# Patient Record
Sex: FEMALE | Race: WHITE | HISPANIC OR LATINO | Employment: FULL TIME | URBAN - METROPOLITAN AREA
[De-identification: names, ages, dates, MRNs, and addresses within clinical notes are randomized per-mention and may not be internally consistent; named-entity substitution may affect disease eponyms.]

---

## 2023-08-21 ENCOUNTER — APPOINTMENT (OUTPATIENT)
Dept: PRIMARY CARE | Facility: CLINIC | Age: 38
End: 2023-08-21
Payer: COMMERCIAL

## 2023-08-23 PROBLEM — J45.909 ASTHMA IN ADULT (HHS-HCC): Status: ACTIVE | Noted: 2023-08-23

## 2023-08-23 PROBLEM — B00.9 HERPES SIMPLEX: Status: ACTIVE | Noted: 2023-08-23

## 2023-08-23 PROBLEM — E87.1 HYPONATREMIA: Status: ACTIVE | Noted: 2023-08-23

## 2023-08-23 RX ORDER — QUINIDINE GLUCONATE 324 MG
27 TABLET, EXTENDED RELEASE ORAL
COMMUNITY
Start: 2021-01-04

## 2023-08-23 RX ORDER — ALBUTEROL SULFATE 90 UG/1
AEROSOL, METERED RESPIRATORY (INHALATION)
COMMUNITY
Start: 2020-03-25

## 2023-08-23 RX ORDER — B-COMPLEX WITH VITAMIN C
1 TABLET ORAL DAILY
COMMUNITY
Start: 2021-01-04

## 2023-08-23 RX ORDER — ACYCLOVIR 50 MG/G
OINTMENT TOPICAL
COMMUNITY
Start: 2022-09-12

## 2023-08-24 ENCOUNTER — LAB (OUTPATIENT)
Dept: LAB | Facility: LAB | Age: 38
End: 2023-08-24
Payer: COMMERCIAL

## 2023-08-24 ENCOUNTER — OFFICE VISIT (OUTPATIENT)
Dept: PRIMARY CARE | Facility: CLINIC | Age: 38
End: 2023-08-24
Payer: COMMERCIAL

## 2023-08-24 VITALS
HEIGHT: 65 IN | TEMPERATURE: 98.1 F | WEIGHT: 139 LBS | SYSTOLIC BLOOD PRESSURE: 120 MMHG | DIASTOLIC BLOOD PRESSURE: 76 MMHG | BODY MASS INDEX: 23.16 KG/M2

## 2023-08-24 DIAGNOSIS — Z00.00 HEALTH CARE MAINTENANCE: ICD-10-CM

## 2023-08-24 DIAGNOSIS — Z00.00 HEALTH CARE MAINTENANCE: Primary | ICD-10-CM

## 2023-08-24 DIAGNOSIS — Z12.31 ENCOUNTER FOR SCREENING MAMMOGRAM FOR MALIGNANT NEOPLASM OF BREAST: ICD-10-CM

## 2023-08-24 DIAGNOSIS — Z12.4 ENCOUNTER FOR PAPANICOLAOU SMEAR FOR CERVICAL CANCER SCREENING: ICD-10-CM

## 2023-08-24 LAB
ALANINE AMINOTRANSFERASE (SGPT) (U/L) IN SER/PLAS: 11 U/L (ref 7–45)
ALBUMIN (G/DL) IN SER/PLAS: 4.3 G/DL (ref 3.4–5)
ALKALINE PHOSPHATASE (U/L) IN SER/PLAS: 42 U/L (ref 33–110)
ANION GAP IN SER/PLAS: 11 MMOL/L (ref 10–20)
ASPARTATE AMINOTRANSFERASE (SGOT) (U/L) IN SER/PLAS: 15 U/L (ref 9–39)
BASOPHILS (10*3/UL) IN BLOOD BY AUTOMATED COUNT: 0.08 X10E9/L (ref 0–0.1)
BASOPHILS/100 LEUKOCYTES IN BLOOD BY AUTOMATED COUNT: 1.2 % (ref 0–2)
BILIRUBIN TOTAL (MG/DL) IN SER/PLAS: 0.7 MG/DL (ref 0–1.2)
CALCIUM (MG/DL) IN SER/PLAS: 9.4 MG/DL (ref 8.6–10.3)
CARBON DIOXIDE, TOTAL (MMOL/L) IN SER/PLAS: 27 MMOL/L (ref 21–32)
CHLORIDE (MMOL/L) IN SER/PLAS: 104 MMOL/L (ref 98–107)
CHOLESTEROL (MG/DL) IN SER/PLAS: 108 MG/DL (ref 0–199)
CHOLESTEROL IN HDL (MG/DL) IN SER/PLAS: 46.4 MG/DL
CHOLESTEROL/HDL RATIO: 2.3
CREATININE (MG/DL) IN SER/PLAS: 0.84 MG/DL (ref 0.5–1.05)
EOSINOPHILS (10*3/UL) IN BLOOD BY AUTOMATED COUNT: 0.19 X10E9/L (ref 0–0.7)
EOSINOPHILS/100 LEUKOCYTES IN BLOOD BY AUTOMATED COUNT: 2.9 % (ref 0–6)
ERYTHROCYTE DISTRIBUTION WIDTH (RATIO) BY AUTOMATED COUNT: 13.7 % (ref 11.5–14.5)
ERYTHROCYTE MEAN CORPUSCULAR HEMOGLOBIN CONCENTRATION (G/DL) BY AUTOMATED: 32.2 G/DL (ref 32–36)
ERYTHROCYTE MEAN CORPUSCULAR VOLUME (FL) BY AUTOMATED COUNT: 90 FL (ref 80–100)
ERYTHROCYTES (10*6/UL) IN BLOOD BY AUTOMATED COUNT: 4.2 X10E12/L (ref 4–5.2)
GFR FEMALE: 90 ML/MIN/1.73M2
GLUCOSE (MG/DL) IN SER/PLAS: 79 MG/DL (ref 74–99)
HEMATOCRIT (%) IN BLOOD BY AUTOMATED COUNT: 37.6 % (ref 36–46)
HEMOGLOBIN (G/DL) IN BLOOD: 12.1 G/DL (ref 12–16)
IMMATURE GRANULOCYTES/100 LEUKOCYTES IN BLOOD BY AUTOMATED COUNT: 0.2 % (ref 0–0.9)
LDL: 56 MG/DL (ref 0–99)
LEUKOCYTES (10*3/UL) IN BLOOD BY AUTOMATED COUNT: 6.5 X10E9/L (ref 4.4–11.3)
LYMPHOCYTES (10*3/UL) IN BLOOD BY AUTOMATED COUNT: 2.06 X10E9/L (ref 1.2–4.8)
LYMPHOCYTES/100 LEUKOCYTES IN BLOOD BY AUTOMATED COUNT: 31.7 % (ref 13–44)
MONOCYTES (10*3/UL) IN BLOOD BY AUTOMATED COUNT: 0.35 X10E9/L (ref 0.1–1)
MONOCYTES/100 LEUKOCYTES IN BLOOD BY AUTOMATED COUNT: 5.4 % (ref 2–10)
NEUTROPHILS (10*3/UL) IN BLOOD BY AUTOMATED COUNT: 3.8 X10E9/L (ref 1.2–7.7)
NEUTROPHILS/100 LEUKOCYTES IN BLOOD BY AUTOMATED COUNT: 58.6 % (ref 40–80)
PLATELETS (10*3/UL) IN BLOOD AUTOMATED COUNT: 249 X10E9/L (ref 150–450)
POTASSIUM (MMOL/L) IN SER/PLAS: 4.1 MMOL/L (ref 3.5–5.3)
PROTEIN TOTAL: 6.7 G/DL (ref 6.4–8.2)
SODIUM (MMOL/L) IN SER/PLAS: 138 MMOL/L (ref 136–145)
THYROTROPIN (MIU/L) IN SER/PLAS BY DETECTION LIMIT <= 0.05 MIU/L: 2.14 MIU/L (ref 0.44–3.98)
TRIGLYCERIDE (MG/DL) IN SER/PLAS: 27 MG/DL (ref 0–149)
UREA NITROGEN (MG/DL) IN SER/PLAS: 24 MG/DL (ref 6–23)
VLDL: 5 MG/DL (ref 0–40)

## 2023-08-24 PROCEDURE — 36415 COLL VENOUS BLD VENIPUNCTURE: CPT

## 2023-08-24 PROCEDURE — 99395 PREV VISIT EST AGE 18-39: CPT | Performed by: NURSE PRACTITIONER

## 2023-08-24 PROCEDURE — 80061 LIPID PANEL: CPT

## 2023-08-24 PROCEDURE — 84443 ASSAY THYROID STIM HORMONE: CPT

## 2023-08-24 PROCEDURE — 1036F TOBACCO NON-USER: CPT | Performed by: NURSE PRACTITIONER

## 2023-08-24 PROCEDURE — 80053 COMPREHEN METABOLIC PANEL: CPT

## 2023-08-24 PROCEDURE — 87624 HPV HI-RISK TYP POOLED RSLT: CPT

## 2023-08-24 PROCEDURE — 88175 CYTOPATH C/V AUTO FLUID REDO: CPT

## 2023-08-24 PROCEDURE — 85025 COMPLETE CBC W/AUTO DIFF WBC: CPT

## 2023-08-24 RX ORDER — CALCIUM CARB/VITAMIN D3/VIT K1 500-500-40
TABLET,CHEWABLE ORAL
COMMUNITY

## 2023-08-24 ASSESSMENT — ENCOUNTER SYMPTOMS
PSYCHIATRIC NEGATIVE: 1
NEUROLOGICAL NEGATIVE: 1
RESPIRATORY NEGATIVE: 1
CARDIOVASCULAR NEGATIVE: 1
MUSCULOSKELETAL NEGATIVE: 1
CONSTITUTIONAL NEGATIVE: 1

## 2023-08-24 ASSESSMENT — PATIENT HEALTH QUESTIONNAIRE - PHQ9
1. LITTLE INTEREST OR PLEASURE IN DOING THINGS: NOT AT ALL
SUM OF ALL RESPONSES TO PHQ9 QUESTIONS 1 AND 2: 0
1. LITTLE INTEREST OR PLEASURE IN DOING THINGS: SEVERAL DAYS
2. FEELING DOWN, DEPRESSED OR HOPELESS: NOT AT ALL

## 2023-08-24 NOTE — PROGRESS NOTES
"Subjective   Patient ID: Lanette Weldon is a 37 y.o. female who presents for Annual Exam.    HPI   Overall health good health  Dental exam every 6 months.  Brushes twice daily floss occasionally  Vision Wears glasses, eye exam every 2 years   Hearing no loss  Immunization up to date  Diet well balanced  Exercise 6 days a week, cardio, strength 45-90 minutes  Alcohol 0-1/month  Drug use none   Screening Last pap 2020 was normal.     Review of Systems   Constitutional: Negative.    Respiratory: Negative.     Cardiovascular: Negative.    Musculoskeletal: Negative.    Neurological: Negative.    Psychiatric/Behavioral: Negative.         Objective   /76 (BP Location: Right arm, Patient Position: Sitting)   Temp 36.7 °C (98.1 °F) (Temporal)   Ht 1.651 m (5' 5\")   Wt 63 kg (139 lb)   BMI 23.13 kg/m²    Waist circumference 29 inches  Physical Exam  Constitutional:       Appearance: Normal appearance.   HENT:      Right Ear: Tympanic membrane, ear canal and external ear normal.      Left Ear: Tympanic membrane, ear canal and external ear normal.      Nose: Nose normal.      Mouth/Throat:      Mouth: Mucous membranes are moist.      Pharynx: Oropharynx is clear.   Eyes:      Pupils: Pupils are equal, round, and reactive to light.   Cardiovascular:      Rate and Rhythm: Normal rate and regular rhythm.      Pulses: Normal pulses.   Pulmonary:      Effort: Pulmonary effort is normal.   Abdominal:      General: Abdomen is flat. Bowel sounds are normal.      Palpations: Abdomen is soft.   Musculoskeletal:         General: Normal range of motion.      Cervical back: Normal range of motion.   Lymphadenopathy:      Cervical: No cervical adenopathy.   Skin:     General: Skin is warm.   Neurological:      General: No focal deficit present.      Mental Status: She is alert and oriented to person, place, and time.   Psychiatric:         Mood and Affect: Mood normal.         Behavior: Behavior normal.         Assessment/Plan "   Problem List Items Addressed This Visit    None  Visit Diagnoses       Health care maintenance    -  Primary    Relevant Orders    Comprehensive Metabolic Panel    Lipid Panel    Thyroid Stimulating Hormone    CBC and Auto Differential    Encounter for screening mammogram for malignant neoplasm of breast        Relevant Orders    BI mammo bilateral screening tomosynthesis    Encounter for Papanicolaou smear for cervical cancer screening        Relevant Orders    THINPREP PAP TEST

## 2023-08-25 ENCOUNTER — TELEPHONE (OUTPATIENT)
Dept: PRIMARY CARE | Facility: CLINIC | Age: 38
End: 2023-08-25
Payer: COMMERCIAL

## 2023-09-05 ENCOUNTER — TELEPHONE (OUTPATIENT)
Dept: PRIMARY CARE | Facility: CLINIC | Age: 38
End: 2023-09-05
Payer: COMMERCIAL

## 2023-09-05 LAB
COMPLETE PATHOLOGY REPORT: NORMAL
CONVERTED CLINICAL DIAGNOSIS-HISTORY: NORMAL
CONVERTED DIAGNOSIS COMMENT: NORMAL
CONVERTED FINAL DIAGNOSIS: NORMAL
CONVERTED FINAL REPORT PDF LINK TO COPY AND PASTE: NORMAL

## 2023-10-09 ENCOUNTER — ANCILLARY PROCEDURE (OUTPATIENT)
Dept: RADIOLOGY | Facility: CLINIC | Age: 38
End: 2023-10-09
Payer: COMMERCIAL

## 2023-10-09 DIAGNOSIS — Z12.31 ENCOUNTER FOR SCREENING MAMMOGRAM FOR MALIGNANT NEOPLASM OF BREAST: ICD-10-CM

## 2023-10-09 PROCEDURE — 77067 SCR MAMMO BI INCL CAD: CPT | Mod: BILATERAL PROCEDURE

## 2023-10-09 PROCEDURE — 77063 BREAST TOMOSYNTHESIS BI: CPT | Mod: BILATERAL PROCEDURE

## 2023-10-09 PROCEDURE — 77063 BREAST TOMOSYNTHESIS BI: CPT | Mod: 50

## 2023-10-10 ENCOUNTER — TELEPHONE (OUTPATIENT)
Dept: PRIMARY CARE | Facility: CLINIC | Age: 38
End: 2023-10-10
Payer: COMMERCIAL

## 2023-10-20 ENCOUNTER — TELEPHONE (OUTPATIENT)
Dept: PRIMARY CARE | Facility: CLINIC | Age: 38
End: 2023-10-20
Payer: COMMERCIAL

## 2023-12-11 PROBLEM — N92.6 MISSED PERIOD: Status: RESOLVED | Noted: 2020-12-15 | Resolved: 2023-12-11

## 2023-12-12 ENCOUNTER — OFFICE VISIT (OUTPATIENT)
Dept: PRIMARY CARE | Facility: CLINIC | Age: 38
End: 2023-12-12
Payer: COMMERCIAL

## 2023-12-12 ENCOUNTER — LAB (OUTPATIENT)
Dept: LAB | Facility: LAB | Age: 38
End: 2023-12-12
Payer: COMMERCIAL

## 2023-12-12 VITALS
TEMPERATURE: 98.7 F | DIASTOLIC BLOOD PRESSURE: 74 MMHG | SYSTOLIC BLOOD PRESSURE: 110 MMHG | BODY MASS INDEX: 22.8 KG/M2 | WEIGHT: 137 LBS

## 2023-12-12 DIAGNOSIS — R05.2 SUBACUTE COUGH: ICD-10-CM

## 2023-12-12 DIAGNOSIS — R53.82 CHRONIC FATIGUE: Primary | ICD-10-CM

## 2023-12-12 DIAGNOSIS — R53.82 CHRONIC FATIGUE: ICD-10-CM

## 2023-12-12 DIAGNOSIS — R09.81 NASAL CONGESTION: ICD-10-CM

## 2023-12-12 LAB
25(OH)D3 SERPL-MCNC: 24 NG/ML (ref 30–100)
BASOPHILS # BLD AUTO: 0.07 X10*3/UL (ref 0–0.1)
BASOPHILS NFR BLD AUTO: 0.7 %
EOSINOPHIL # BLD AUTO: 0.16 X10*3/UL (ref 0–0.7)
EOSINOPHIL NFR BLD AUTO: 1.5 %
ERYTHROCYTE [DISTWIDTH] IN BLOOD BY AUTOMATED COUNT: 14.3 % (ref 11.5–14.5)
FERRITIN SERPL-MCNC: 25 NG/ML (ref 8–150)
HCT VFR BLD AUTO: 38.1 % (ref 36–46)
HGB BLD-MCNC: 12.3 G/DL (ref 12–16)
IMM GRANULOCYTES # BLD AUTO: 0.03 X10*3/UL (ref 0–0.7)
IMM GRANULOCYTES NFR BLD AUTO: 0.3 % (ref 0–0.9)
IRON SATN MFR SERPL: 30 % (ref 25–45)
IRON SERPL-MCNC: 100 UG/DL (ref 35–150)
LYMPHOCYTES # BLD AUTO: 2.68 X10*3/UL (ref 1.2–4.8)
LYMPHOCYTES NFR BLD AUTO: 25.4 %
MCH RBC QN AUTO: 28.3 PG (ref 26–34)
MCHC RBC AUTO-ENTMCNC: 32.3 G/DL (ref 32–36)
MCV RBC AUTO: 88 FL (ref 80–100)
MONOCYTES # BLD AUTO: 0.42 X10*3/UL (ref 0.1–1)
MONOCYTES NFR BLD AUTO: 4 %
NEUTROPHILS # BLD AUTO: 7.19 X10*3/UL (ref 1.2–7.7)
NEUTROPHILS NFR BLD AUTO: 68.1 %
NRBC BLD-RTO: 0 /100 WBCS (ref 0–0)
PLATELET # BLD AUTO: 305 X10*3/UL (ref 150–450)
RBC # BLD AUTO: 4.34 X10*6/UL (ref 4–5.2)
TIBC SERPL-MCNC: 329 UG/DL (ref 240–445)
UIBC SERPL-MCNC: 229 UG/DL (ref 110–370)
VIT B12 SERPL-MCNC: 557 PG/ML (ref 211–911)
WBC # BLD AUTO: 10.6 X10*3/UL (ref 4.4–11.3)

## 2023-12-12 PROCEDURE — 83540 ASSAY OF IRON: CPT

## 2023-12-12 PROCEDURE — 1036F TOBACCO NON-USER: CPT | Performed by: NURSE PRACTITIONER

## 2023-12-12 PROCEDURE — 36415 COLL VENOUS BLD VENIPUNCTURE: CPT

## 2023-12-12 PROCEDURE — 99214 OFFICE O/P EST MOD 30 MIN: CPT | Performed by: NURSE PRACTITIONER

## 2023-12-12 PROCEDURE — 83550 IRON BINDING TEST: CPT

## 2023-12-12 PROCEDURE — 82728 ASSAY OF FERRITIN: CPT

## 2023-12-12 PROCEDURE — 86038 ANTINUCLEAR ANTIBODIES: CPT

## 2023-12-12 PROCEDURE — 85025 COMPLETE CBC W/AUTO DIFF WBC: CPT

## 2023-12-12 PROCEDURE — 82607 VITAMIN B-12: CPT

## 2023-12-12 PROCEDURE — 82306 VITAMIN D 25 HYDROXY: CPT

## 2023-12-12 RX ORDER — SERTRALINE HYDROCHLORIDE 25 MG/1
TABLET, FILM COATED ORAL
COMMUNITY
Start: 2023-11-19

## 2023-12-12 ASSESSMENT — ENCOUNTER SYMPTOMS
WHEEZING: 0
NEUROLOGICAL NEGATIVE: 1
PSYCHIATRIC NEGATIVE: 1
CONSTITUTIONAL NEGATIVE: 1
RESPIRATORY NEGATIVE: 1
CARDIOVASCULAR NEGATIVE: 1
MUSCULOSKELETAL NEGATIVE: 1

## 2023-12-12 NOTE — PROGRESS NOTES
Subjective   Patient ID: Lanette Weldon is a 38 y.o. female who presents for URI (Runny nose, cough, body aches, fatigue intermittent x 5 months).    HPI Presents today with concerns for being ill 4-5 times in the last 6 months.  Her son is in , he gets sick and then she gets it.  Last abut 7-10 days and goes away.  Is concerned it is happening so often.  States she gets back to normal activity and strength each time and then will get sick again. Is fatigued when sick but does also recover from that  Blood work 8/2023 reviewed as noted.    was sick two of the times and he was tested for influenza and he had it.   Always starts as sore throat and then moves into nasal congestion and then gets the cough.  Symptoms always resolve completely    Review of Systems   Constitutional: Negative.    HENT:          As noted in HPI     Respiratory: Negative.  Negative for wheezing.         As noted in HPI     Cardiovascular: Negative.    Musculoskeletal: Negative.    Neurological: Negative.    Psychiatric/Behavioral: Negative.         Objective   /74 (BP Location: Left arm, Patient Position: Sitting)   Temp 37.1 °C (98.7 °F) (Temporal)   Wt 62.1 kg (137 lb)   BMI 22.80 kg/m²     Physical Exam  Constitutional:       Appearance: Normal appearance.   HENT:      Right Ear: Tympanic membrane, ear canal and external ear normal.      Left Ear: Tympanic membrane, ear canal and external ear normal.      Mouth/Throat:      Mouth: Mucous membranes are moist.      Pharynx: Oropharynx is clear.   Neck:      Thyroid: No thyroid mass, thyromegaly or thyroid tenderness.   Cardiovascular:      Rate and Rhythm: Normal rate and regular rhythm.   Pulmonary:      Effort: Pulmonary effort is normal.      Breath sounds: Normal breath sounds.   Musculoskeletal:         General: Normal range of motion.   Lymphadenopathy:      Cervical: No cervical adenopathy.   Neurological:      General: No focal deficit present.      Mental  Status: She is alert.   Psychiatric:         Mood and Affect: Mood normal.         Behavior: Behavior normal.         Assessment/Plan   Problem List Items Addressed This Visit    None  Visit Diagnoses         Codes    Chronic fatigue    -  Primary R53.82    Relevant Orders    CBC and Auto Differential    Vitamin B12    Vitamin D 25-Hydroxy,Total (for eval of Vitamin D levels)    HERB    Ferritin    Iron and TIBC    Nasal congestion     R09.81    Relevant Orders    Ferritin    Subacute cough     R05.2    Relevant Orders    Ferritin

## 2023-12-13 ENCOUNTER — TELEPHONE (OUTPATIENT)
Dept: PRIMARY CARE | Facility: CLINIC | Age: 38
End: 2023-12-13
Payer: COMMERCIAL

## 2023-12-13 DIAGNOSIS — R05.2 SUBACUTE COUGH: Primary | ICD-10-CM

## 2023-12-13 DIAGNOSIS — B00.9 HERPES SIMPLEX: ICD-10-CM

## 2023-12-13 LAB — ANA SER QL HEP2 SUBST: NEGATIVE

## 2023-12-13 NOTE — TELEPHONE ENCOUNTER
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 Please advise

## 2023-12-15 ENCOUNTER — TELEPHONE (OUTPATIENT)
Dept: PRIMARY CARE | Facility: CLINIC | Age: 38
End: 2023-12-15
Payer: COMMERCIAL

## 2023-12-15 RX ORDER — ACYCLOVIR 400 MG/1
400 TABLET ORAL 3 TIMES DAILY
Qty: 15 TABLET | Refills: 0 | Status: SHIPPED | OUTPATIENT
Start: 2023-12-15 | End: 2023-12-20

## 2023-12-15 NOTE — TELEPHONE ENCOUNTER
Pt called to request a refill on her Acyclovir tablets, pt states she has a cold sore in the mouth and has no refills.      [facility] [phone]    Pt also needs to know how the units of the Vitamin D3 you would like her to be taking.

## 2024-05-07 ENCOUNTER — TELEPHONE (OUTPATIENT)
Dept: PRIMARY CARE | Facility: CLINIC | Age: 39
End: 2024-05-07
Payer: COMMERCIAL

## 2024-05-07 DIAGNOSIS — J30.2 SEASONAL ALLERGIES: Primary | ICD-10-CM

## 2024-05-07 NOTE — TELEPHONE ENCOUNTER
Patient states she is suffering badly from what she believes is seasonal allergies & asking if you can refer her to an allergist. Please advise

## 2024-05-07 NOTE — TELEPHONE ENCOUNTER
Spoke with patient, sent MyChart message with the OTC medications so she/her  can check if safe to take while she is breastfeeding. Gave contact info for referral scheduling. She verbalized understanding

## 2024-06-24 ENCOUNTER — TELEPHONE (OUTPATIENT)
Dept: PRIMARY CARE | Facility: CLINIC | Age: 39
End: 2024-06-24
Payer: COMMERCIAL

## 2024-06-24 NOTE — TELEPHONE ENCOUNTER
Patient requesting refill on her Acyclovir ointment to [facility]. Has been having cold sore breakouts.  Last OV 12/12/23  Please advise

## 2024-06-25 DIAGNOSIS — B00.9 HERPES SIMPLEX: Primary | ICD-10-CM

## 2024-06-25 RX ORDER — ACYCLOVIR 50 MG/G
OINTMENT TOPICAL
Qty: 15 G | Refills: 1 | Status: SHIPPED | OUTPATIENT
Start: 2024-06-25

## 2024-07-12 ENCOUNTER — TELEPHONE (OUTPATIENT)
Dept: PRIMARY CARE | Facility: CLINIC | Age: 39
End: 2024-07-12
Payer: COMMERCIAL

## 2024-07-12 DIAGNOSIS — N39.3 STRESS INCONTINENCE OF URINE: Primary | ICD-10-CM

## 2024-07-12 NOTE — TELEPHONE ENCOUNTER
Patient called requesting a referral for pelvic floor therapy. States she's been working out more and has noticed she's had some leaking. Please advise

## 2024-07-15 NOTE — TELEPHONE ENCOUNTER
Patient informed of above information and verbalized understanding, gave contact info for [facility] [phone]

## 2024-08-13 ENCOUNTER — EVALUATION (OUTPATIENT)
Dept: PHYSICAL THERAPY | Facility: HOSPITAL | Age: 39
End: 2024-08-13
Payer: COMMERCIAL

## 2024-08-13 DIAGNOSIS — N39.3 STRESS INCONTINENCE OF URINE: Primary | ICD-10-CM

## 2024-08-13 DIAGNOSIS — M62.81 MUSCLE WEAKNESS: ICD-10-CM

## 2024-08-13 PROCEDURE — 97535 SELF CARE MNGMENT TRAINING: CPT | Mod: GP | Performed by: PHYSICAL THERAPIST

## 2024-08-13 PROCEDURE — 97162 PT EVAL MOD COMPLEX 30 MIN: CPT | Mod: GP | Performed by: PHYSICAL THERAPIST

## 2024-08-13 PROCEDURE — 97110 THERAPEUTIC EXERCISES: CPT | Mod: GP | Performed by: PHYSICAL THERAPIST

## 2024-08-13 ASSESSMENT — PAIN SCALES - GENERAL: PAINLEVEL_OUTOF10: 6

## 2024-08-13 ASSESSMENT — ENCOUNTER SYMPTOMS
LOSS OF SENSATION IN FEET: 0
DEPRESSION: 1
OCCASIONAL FEELINGS OF UNSTEADINESS: 0

## 2024-08-13 ASSESSMENT — PAIN - FUNCTIONAL ASSESSMENT: PAIN_FUNCTIONAL_ASSESSMENT: 0-10

## 2024-08-13 ASSESSMENT — PATIENT HEALTH QUESTIONNAIRE - PHQ9
10. IF YOU CHECKED OFF ANY PROBLEMS, HOW DIFFICULT HAVE THESE PROBLEMS MADE IT FOR YOU TO DO YOUR WORK, TAKE CARE OF THINGS AT HOME, OR GET ALONG WITH OTHER PEOPLE: SOMEWHAT DIFFICULT
2. FEELING DOWN, DEPRESSED OR HOPELESS: NOT AT ALL
SUM OF ALL RESPONSES TO PHQ9 QUESTIONS 1 AND 2: 1
1. LITTLE INTEREST OR PLEASURE IN DOING THINGS: SEVERAL DAYS

## 2024-08-13 ASSESSMENT — PAIN DESCRIPTION - DESCRIPTORS: DESCRIPTORS: ACHING

## 2024-08-13 NOTE — PROGRESS NOTES
" Physical Therapy    PELVIC FLOOR EVALUATION AND TREATMENT    Name: Lanette Weldon  MRN: [id number]  : 1985  Today's Date: 2024  Visit Number: 1  PT Evaluation Time Entry  PT Evaluation (Moderate) Time Entry: 30  PT Therapeutic Procedures Time Entry  Therapeutic Exercise Time Entry: 15  Self-Care/Home Mgmt Trainin                 Assessment:   PT Assessment  PT Assessment Results: Decreased strength, Decreased endurance, Decreased coordination, Pain  Rehab Prognosis: Good    Pt presents with symptoms of mixed stress and urge urinary incontinence, urinary urgency, nocturia, core and lateral pelvic girdle weakness, decreased endurance, PF muscle weakness and poor endurance impairing her QOL with higher intensity functional activities.  Recommend skilled pelvic floor physical therapy to address the above deficits that affect functional activities of daily living.   Skilled intervention is needed to train and provide proper technique, educate patient on progression, risks, prognosis, and provide adequate feedback for technique correction and implementation.     Discharge planned upon achievement of goals or reaching maximum benefit from skilled physical therapy services.    Plan:   OP PT Plan  Treatment/Interventions: Biofeedback, Education/ Instruction, Electrical stimulation, Manual therapy, Neuromuscular re-education, Self care/ home management, Therapeutic activities, Therapeutic exercises  PT Plan: Skilled PT  PT Frequency: 1 time per week  Duration: 12 weeks  Number of Treatments Authorized: 60v per yr  Rehab Potential: Good  Plan of Care Agreement: Patient  *Discuss timed voiding during the day in attempt to reduce nocturia    Interventions: Issued handouts: Overcoming the urge, the \"Knack\"    Current Problem:  Problem List Items Addressed This Visit             ICD-10-CM    Stress incontinence of urine - Primary N39.3    Relevant Orders    Follow Up In Physical Therapy    Muscle weakness M62.81    " Relevant Orders    Follow Up In Physical Therapy     Subjective   General  Pt reports noticing urinary leakage for a few months when she is exercising at the gym, more so worse with cardio than weights. She feels like it has steadily been worsening. She reports life-long hx of urinary urgency and also has been getting up to use the bathroom 2-3x/night since having her child. She also reports having issues with being unable to hold her bladder once she gets the urge and has had to void in the car on a regular basis due to being unable to stop to find a bathroom.    Precautions  Precautions  STEADI Fall Risk Score (The score of 4 or more indicates an increased risk of falling): 4  Precautions Comment: none noted     Pain  Pain Assessment: 0-10  0-10 (Numeric) Pain Score: 6  Pain Type: Chronic pain  Pain Location: Back  Pain Orientation: Lower, Left  Pain Radiating Towards: intermittently L LE  Pain Descriptors: Aching  Pain Frequency: Intermittent    Objective   PELVIC HISTORY:  Chief Complaint/Description of Symptoms: UTE and leakage with coughing, laughing, sneezing and exercise, especially higher impact.   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    EXERCISE:  Do you do Kegels? Intermittently   Current exercise regime: 6 days/week    BLADDER:  Bladder  Excessive urinary urgency: always  Daytime voiding frequency : 4-6x/day  Nighttime voiding frequency: 2-3 times  Unintentional urine loss: almost daily  Leakage occurs with: exercise, cough, lifting, sneeze, urge  Leakage amount: small  Protection: none  Daily fluid intake: 80oz water  Daily caffeine  intake: minmal  Difficulty initiating urine flow: not at all  Slow/Weak urine stream: not at all  Able to void completely: No, Yes  Frequent UTI's: No    BOWEL:  Bowel  Unintentional stool loss: never  Bowel movement frequency: once a day, every other day  Frequent diarrhea: No  Constipation/straining,incomplete bowel movement: No  Taking stool softeners or fiber supplements: No  [place] Stool Rating Scale: variable consistency  Excessive Bowel Urgency: Intermittent    EXTERNAL OBSERVATION:  Voluntary Contraction: (+)   Voluntary Relaxation: (+)  Involuntary Contraction: (-)  Involuntary Relaxation (+)        INTERNAL VAGINAL EXAMINATION:  PFM Strength: 3,3+/5  Coordination: 12 in 10 seconds   Endurance: 1-2 second holds x 2 repetitions    INTERNAL PALPATION:   (-) pain and tightness at PF musculature    EXTERNAL PALPATION:  Levator Ani: (-) Pain/Tightness   Bulbo: (-) Pain/Tightness   Ischiocavernosus: (-) Pain/Tightness  Transverse perineum: (-) Pain/Tightness     Iliopsoas: (-) Pain/Tightness   Adductor: (-) Pain/Tightness   Abdominals: (-) Pain/Tightness     POSTURE: Increased lumbar lordosis      MMT R/L:  Hip flex: 4/5 [place]  Hip add: 5/5 Michelet  Hip abd: 4-/5 Miche[place]  Hip IR: 4-,4/5  ; 3+,4-/5  Hip ER: 4-/5 Michelet  Hip ext: 4-/5  TA: 3+,4-/5    ROM R/L:  Lumbar flex: Slightly decreased due to LBP  Lumbar ext: WFL  Hip flex: WNL  Hip ext: WNL  Hip IR: slightly decreased  Hip ER: WNL    FLEXIBILITY R/L:  Hamstrings: mild tightness  Psoas: Mild tightness    OUTCOMES MEASURE:  Female NIH-CPSI: 22 (Pain 10, Urinary 3, QOL 9)    Education:   Outpatient Education  Individual(s) Educated: Patient  Education Provided: Anatomy, Body Mechanics, Home Exercise Program, POC, Physiology  Risk and Benefits Discussed with Patient/Caregiver/Other: yes  Patient/Caregiver Demonstrated Understanding: yes  Plan of Care Discussed and Agreed Upon: yes  Patient Response to Education: Patient/Caregiver Verbalized Understanding of Information,  Patient/Caregiver Performed Return Demonstration of Exercises/Activities   HEP issued: [email or web address]  Access Code: [email or web address]    Careplan Goals:  Active       UTE       1) Pt to be Indep with a home exercise program with >80% adherence by the patient       Start:  08/13/24    Expected End:  09/24/24            2) Pt to report 50% reduction in incontinence episodes to indicate increased bladder control during exercise       Start:  08/13/24    Expected End:  11/05/24            3) Improve MMT of lateral pelvic girdle to 5/5to improve pelvic stability with functional activities        Start:  08/13/24    Expected End:  11/05/24            4) Functional Outcome NIH CPSI score improves by >50% raw score to indicate improvement in subscales       Start:  08/13/24    Expected End:  11/05/24            5) Pt to report decreased nocturia to </=1x/night to improve her sleep quality       Start:  08/13/24    Expected End:  11/05/24                  Pippa Aviles, PT

## 2024-08-21 PROBLEM — M62.81 MUSCLE WEAKNESS: Status: ACTIVE | Noted: 2024-08-21

## 2024-08-22 ENCOUNTER — APPOINTMENT (OUTPATIENT)
Dept: ALLERGY | Facility: CLINIC | Age: 39
End: 2024-08-22
Payer: COMMERCIAL

## 2024-08-26 ENCOUNTER — LAB (OUTPATIENT)
Dept: LAB | Facility: LAB | Age: 39
End: 2024-08-26
Payer: COMMERCIAL

## 2024-08-26 ENCOUNTER — APPOINTMENT (OUTPATIENT)
Dept: ALLERGY | Facility: CLINIC | Age: 39
End: 2024-08-26
Payer: COMMERCIAL

## 2024-08-26 VITALS
HEIGHT: 65 IN | HEART RATE: 66 BPM | TEMPERATURE: 98.2 F | SYSTOLIC BLOOD PRESSURE: 112 MMHG | BODY MASS INDEX: 25.69 KG/M2 | WEIGHT: 154.2 LBS | OXYGEN SATURATION: 98 % | DIASTOLIC BLOOD PRESSURE: 70 MMHG

## 2024-08-26 DIAGNOSIS — J30.2 SEASONAL ALLERGIES: ICD-10-CM

## 2024-08-26 DIAGNOSIS — T63.94XA TOXIC EFFECT OF VENOM, UNDETERMINED INTENT, INITIAL ENCOUNTER: ICD-10-CM

## 2024-08-26 DIAGNOSIS — J31.0 CHRONIC RHINITIS: Primary | ICD-10-CM

## 2024-08-26 DIAGNOSIS — J31.0 CHRONIC RHINITIS: ICD-10-CM

## 2024-08-26 PROCEDURE — 99204 OFFICE O/P NEW MOD 45 MIN: CPT | Performed by: ALLERGY & IMMUNOLOGY

## 2024-08-26 PROCEDURE — 86003 ALLG SPEC IGE CRUDE XTRC EA: CPT

## 2024-08-26 PROCEDURE — 82785 ASSAY OF IGE: CPT

## 2024-08-26 PROCEDURE — 36415 COLL VENOUS BLD VENIPUNCTURE: CPT

## 2024-08-26 RX ORDER — OLOPATADINE HYDROCHLORIDE 2 MG/ML
1 SOLUTION/ DROPS OPHTHALMIC DAILY PRN
Qty: 2.5 ML | Refills: 5 | Status: SHIPPED | OUTPATIENT
Start: 2024-08-26 | End: 2025-08-26

## 2024-08-26 RX ORDER — BENZOCAINE .13; .15; .5; 2 G/100G; G/100G; G/100G; G/100G
2 GEL ORAL DAILY
Qty: 8.6 G | Refills: 11 | Status: SHIPPED | OUTPATIENT
Start: 2024-08-26 | End: 2025-08-26

## 2024-08-26 ASSESSMENT — ENCOUNTER SYMPTOMS
RESPIRATORY NEGATIVE: 1
EYES NEGATIVE: 1
ALLERGIC/IMMUNOLOGIC NEGATIVE: 1
CONSTITUTIONAL NEGATIVE: 1
GASTROINTESTINAL NEGATIVE: 1
CARDIOVASCULAR NEGATIVE: 1
HEMATOLOGIC/LYMPHATIC NEGATIVE: 1
MUSCULOSKELETAL NEGATIVE: 1

## 2024-08-26 NOTE — PROGRESS NOTES
Lanette Weldon presents for initial evaluation today.      Lanette Weldon was seen at the request of DO Bowers for a chief complaint of concern for allergies; a report with my findings is being sent via written or electronic means to DO Bowers with my recommendations for treatment    She provides the following history:    Rhinitis:  She reports that she gets  itchy eyes, runny nose, sneezing, nasal congestion, post-nasal drainage and cough.  She notes that she had these symptoms since being a teenager.  She notes that symptoms have been worse in the past 2-3 years.  From 2012 to about 2017, and previously lived in [facility].  She notes that when she lived in [place] her allergy symptoms are most bothersome in the spring, however in [facility] her symptoms seem to be bothersome spring through fall.  She also notices flare in symptoms with barometric pressure changes.      She is currently breast-feeding.  She used cetirizine 2 days ago.  She is not on nasal spray.  She has tried olopatadine eyedrops which were helpful.    Asthma: She reports that she had childhood asthma.  She has been prescribed Albuterol as needed, but has never used Albuterol.      Eczema: Reports childhood eczema.     Food allergy:  She reports that she had positive allergy testing in [facility] in 2009 which was positive to octopus. She avoids octopus. She tolerates shrimp, lobster, crab, calamari, scallop    Venom allergy:  Reports throat swelling with wasp sting in late teens.  She previously carried an Epipen.  She has had honeybee sting since without issue.     Drug allergy: Reports that NSAIDS and scopolamine caused throat closing.      Environmental History:  Type of home:  House  Pets in the house: Cat, cat is not in bedroom.  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    HENT:  Positive for congestion and postnasal drip.    Eyes: Negative.    Respiratory: Negative.     Cardiovascular: Negative.    Gastrointestinal: Negative.    Musculoskeletal: Negative.    Skin: Negative.    Allergic/Immunologic: Negative.    Hematological: Negative.      Vital signs:  /70 (BP Location: Right arm, Patient Position: Sitting, BP Cuff Size: Adult)   Pulse 66   Temp 36.8 °C (98.2 °F)   Ht 1.651 m (5' 5\")   Wt 69.9 kg (154 lb 3.2 oz) Comment: shoes and clothes on  SpO2 98%   BMI 25.66 kg/m²     Physical Exam:  GENERAL: Alert, oriented and in no acute distress.     HEENT: EYES: No conjunctival injection or cobblestoning. Nose: nasal turbinates are not edematous and are not boggy.  There is no mucous stranding, polyps, or blood    noted. EARS: Tympanic membranes are clear. MOUTH: moist and pink with no exudates, ulcers, or thrush. NECK: is supple, without adenopathy.  No upper airway stridor noted.       HEART: regular rate and rhythm.       LUNGS: Clear to auscultation bilaterally. No wheezing, rhonchi or rales.        ABDOMEN: Positive bowel sounds, soft, nontender, nondistended.       EXTREMITIES: No clubbing or edema.        NEURO:  Normal affect.  Gait normal.      SKIN: No rash, hives, or angioedema noted    Impression:  1. Chronic rhinitis    2. Seasonal allergies    3. Toxic effect of venom, undetermined intent, initial encounter      Assessment and Plan:  Chronic rhinitis: Unable to perform allergy skin prick testing today due to recent antihistamine use.  Will further evaluate with environmental allergen specific IgE panel.  Recommend using Rhinocort (pregnancy category B) 2 sprays each nostril once daily.  We reviewed proper nasal spray administration technique.  May continue to use " cetirizine 10 mg daily as needed. Prescribed olopatadine 0.2% eyedrops 1 drop each eye daily as needed    Possible venom allergy: Will further evaluate with specific IgE to honeybee, wasp, yellow jacket, white hornet, yellow hornet.    Will make further recommendations pending results of testing.

## 2024-08-26 NOTE — LETTER
August 26, 2024     DO Bowers  [address]  [facility]  [facility] [address]    Patient: Lanette Weldon   YOB: 1985   Date of Visit: 8/26/2024       Dear DO Wells:    Thank you for referring Lanette Weldon to me for evaluation. Below are my notes for this consultation.  If you have questions, please do not hesitate to call me. I look forward to following your patient along with you.       Sincerely,     Princess VITO Reich MD      CC: No Recipients  ______________________________________________________________________________________    Lanette Weldon presents for initial evaluation today.      Lanette Weldon was seen at the request of DO Bowers for a chief complaint of concern for allergies; a report with my findings is being sent via written or electronic means to DO Bowers with my recommendations for treatment    She provides the following history:    Rhinitis:  She reports that she gets  itchy eyes, runny nose, sneezing, nasal congestion, post-nasal drainage and cough.  She notes that she had these symptoms since being a teenager.  She notes that symptoms have been worse in the past 2-3 years.  From 2012 to about 2017, and previously lived in [facility].  She notes that when she lived in [place] her allergy symptoms are most bothersome in the spring, however in [facility] her symptoms seem to be bothersome spring through fall.  She also notices flare in symptoms with barometric pressure changes.      She is currently breast-feeding.  She used cetirizine 2 days ago.  She is not on nasal spray.  She has tried olopatadine eyedrops which were helpful.    Asthma: She reports that she had childhood asthma.  She has been prescribed Albuterol as needed, but has never used Albuterol.      Eczema: Reports childhood eczema.     Food allergy:  She reports that she had positive allergy testing in [facility] in 2009 which was  "positive to octopus. She avoids octopus. She tolerates shrimp, lobster, crab, calamari, scallop    Venom allergy:  Reports throat swelling with wasp sting in late teens.  She previously carried an Epipen.  She has had honeybee sting since without issue.     Drug allergy: Reports that NSAIDS and scopolamine caused throat closing.      Environmental History:  Type of home:  House  Pets in the house: Cat, cat is not in bedroom.  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    HENT:  Positive for congestion and postnasal drip.    Eyes: Negative.    Respiratory: Negative.     Cardiovascular: Negative.    Gastrointestinal: Negative.    Musculoskeletal: Negative.    Skin: Negative.    Allergic/Immunologic: Negative.    Hematological: Negative.      Vital signs:  /70 (BP Location: Right arm, Patient Position: Sitting, BP Cuff Size: Adult)   Pulse 66   Temp 36.8 °C (98.2 °F)   Ht 1.651 m (5' 5\")   Wt 69.9 kg (154 lb 3.2 oz) Comment: shoes and clothes on  SpO2 98%   BMI 25.66 kg/m²     Physical Exam:  GENERAL: Alert, oriented and in no acute distress.     HEENT: EYES: No conjunctival injection or cobblestoning. Nose: nasal turbinates are not edematous and are not boggy.  There is no mucous stranding, polyps, or blood    noted. EARS: Tympanic membranes are clear. MOUTH: moist and pink with no exudates, ulcers, or thrush. NECK: is supple, without adenopathy.  No upper airway stridor noted.       HEART: regular rate and rhythm.       LUNGS: Clear to auscultation bilaterally. No wheezing, rhonchi or rales.        ABDOMEN: Positive bowel sounds, soft, nontender, nondistended.       " EXTREMITIES: No clubbing or edema.        NEURO:  Normal affect.  Gait normal.      SKIN: No rash, hives, or angioedema noted    Impression:  1. Chronic rhinitis    2. Seasonal allergies    3. Toxic effect of venom, undetermined intent, initial encounter      Assessment and Plan:  Chronic rhinitis: Unable to perform allergy skin prick testing today due to recent antihistamine use.  Will further evaluate with environmental allergen specific IgE panel.  Recommend using Rhinocort (pregnancy category B) 2 sprays each nostril once daily.  We reviewed proper nasal spray administration technique.  May continue to use cetirizine 10 mg daily as needed. Prescribed olopatadine 0.2% eyedrops 1 drop each eye daily as needed    Possible venom allergy: Will further evaluate with specific IgE to honeybee, wasp, yellow jacket, white hornet, yellow hornet.    Will make further recommendations pending results of testing.

## 2024-08-26 NOTE — PATIENT INSTRUCTIONS
It was nice to meet you today    Please have labs drawn. Please note that some labs will come back sooner than others.  We will contact you when all labs have returned so that we can discuss results.   She lived in [place]  Recommend starting Rhinocort 2 sprays each nostril once daily    Technique for nasal spray administration:  First, look slightly down, breathe normally, you do not need to sniff while you spray.  To use the nose spray, place the tip in your nose with the opposite hand (left hand, right side of nose, right hand, left side of nose), and aim or point toward the outside of the nose.  Do not sniff or snort medication afterwards as this can cause most of the medication to be swallowed.  Rather, dab your nose with a tissue if any runs out.    You may use Cetirizine (Zyrtec) 10 mg once daily as needed     You may use olopatadine 0.2% eyedrops 1 drop each eye daily as needed    We will contact you with results and discuss plan for follow up

## 2024-08-27 LAB
A ALTERNATA IGE QN: <0.1 KU/L
A FUMIGATUS IGE QN: <0.1 KU/L
BERMUDA GRASS IGE QN: 5.47 KU/L
BOXELDER IGE QN: 0.89 KU/L
C HERBARUM IGE QN: <0.1 KU/L
CALIF WALNUT POLN IGE QN: 4.79 KU/L
CAT DANDER IGE QN: 26.2 KU/L
CMN PIGWEED IGE QN: 1.46 KU/L
COMMON RAGWEED IGE QN: 2.32 KU/L
COTTONWOOD IGE QN: <0.1 KU/L
D FARINAE IGE QN: 8.46 KU/L
D PTERONYSS IGE QN: 10.2 KU/L
DOG DANDER IGE QN: 5.96 KU/L
ENGL PLANTAIN IGE QN: 0.15 KU/L
GOOSEFOOT IGE QN: 0.34 KU/L
JOHNSON GRASS IGE QN: 7.21 KU/L
KENT BLUE GRASS IGE QN: 17.1 KU/L
LONDON PLANE IGE QN: 0.66 KU/L
MT JUNIPER IGE QN: <0.1 KU/L
P NOTATUM IGE QN: <0.1 KU/L
PECAN/HICK TREE IGE QN: 12.3 KU/L
ROACH IGE QN: <0.1 KU/L
SALTWORT IGE QN: 0.47 KU/L
SHEEP SORREL IGE QN: <0.1 KU/L
SILVER BIRCH IGE QN: >100 KU/L
TIMOTHY IGE QN: 11.1 KU/L
TOTAL IGE SMQN RAST: 402 KU/L
WHITE ASH IGE QN: 0.54 KU/L
WHITE ELM IGE QN: 6.02 KU/L
WHITE MULBERRY IGE QN: <0.1 KU/L
WHITE OAK IGE QN: 67 KU/L

## 2024-08-28 ENCOUNTER — TELEPHONE (OUTPATIENT)
Dept: ALLERGY | Facility: HOSPITAL | Age: 39
End: 2024-08-28
Payer: COMMERCIAL

## 2024-08-28 LAB
HONEY BEE IGE QN: <0.1 KU/L
PAPER WASP IGE QN: <0.1 KU/L
WHITEFACED HORNET IGE QN: <0.1 KU/L
YELLOW HORNET IGE QN: <0.1 KU/L
YELLOW JACKET IGE QN: <0.1 KU/L

## 2024-08-28 NOTE — TELEPHONE ENCOUNTER
Please let Lanette know that her allergy testing is positive for tree pollen, grass pollen, weed pollen, cat, dog, dust mite.  Her venom allergy testing is negative for honeybee, yellowjacket, wasp, yellow hornet, white hornet.  Recommend using Rhinocort, cetirizine, olopatadine eyedrops as discussed at visit.  Would like to see her in follow-up in spring 2025 or sooner if needed.

## 2024-08-29 NOTE — TELEPHONE ENCOUNTER
Placed call to Lanette Weldon who verified patient's name and . Discussed the results and recommendations as described by the provider. Lanette Weldon verbalized understanding and had no further questions or concerns. Lanette Weldon provided with division number in case she has any needs we can help with.

## 2024-08-30 ENCOUNTER — TREATMENT (OUTPATIENT)
Dept: PHYSICAL THERAPY | Facility: HOSPITAL | Age: 39
End: 2024-08-30
Payer: COMMERCIAL

## 2024-08-30 DIAGNOSIS — N39.3 STRESS INCONTINENCE OF URINE: ICD-10-CM

## 2024-08-30 DIAGNOSIS — M62.81 MUSCLE WEAKNESS: ICD-10-CM

## 2024-08-30 PROCEDURE — 97110 THERAPEUTIC EXERCISES: CPT | Mod: GP | Performed by: PHYSICAL THERAPIST

## 2024-08-30 ASSESSMENT — PAIN SCALES - GENERAL: PAINLEVEL_OUTOF10: 3

## 2024-08-30 ASSESSMENT — PAIN - FUNCTIONAL ASSESSMENT: PAIN_FUNCTIONAL_ASSESSMENT: 0-10

## 2024-08-30 NOTE — PROGRESS NOTES
" Physical Therapy    PELVIC FLOOR TREATMENT    Name: Lanette Weldon  MRN: [id number]  : 1985  Today's Date: 2024  Visit Number: 2     PT Therapeutic Procedures Time Entry  Therapeutic Exercise Time Entry: 40                 Assessment:    Pt performed increased core and lateral pelvic girdle strengthening exercises well without any c/o discomfort, but did struggle with maintaining a neutral pelvis during quadruped. Pt instructed to perform a Kegel before body position changes or other functional movements where she feels like she could leak.     Plan:    Monitor progressive HEP issued and bladder retraining progress    Interventions: Reviewed handouts: Overcoming the urge, the \"Knack\"    Current Problem:  Problem List Items Addressed This Visit             ICD-10-CM    Stress incontinence of urine N39.3    Muscle weakness M62.81     Subjective   General  Pt states that she has been able to perform the pelvic floor contractions while doing cardio better without having as much leakage during exercise. Pt presents w/her father who is visiting from [place].    Precautions  Precautions  STEADI Fall Risk Score (The score of 4 or more indicates an increased risk of falling): 4  Precautions Comment: none noted     Pain  Pain Assessment: 0-10  0-10 (Numeric) Pain Score: 3  Pain Type: Chronic pain  Pain Location: Back  Pain Orientation: Lower    Objective     Treatments:  EXERCISES Date 24 Date Date Date    REPS REPS REPS REPS   Visit Number #2             H/L TA 5\"H x5      H/L TA w/marching 1x10 ea      H/L TA w/T-band hip abd Grn 1x10              Bridges 5\"H 1x10             Quadruped TA  -alt UE lifts  -alt LE lifts 5\"H 1x5  5\"H 1x10 ea      Birddogs              Clamshells 1x10 ea      Reverse clamshells 1x10 ea                                                                                                                             HEP Issued progression          OUTCOMES MEASURE:  Female NIH-CPSI: 22 " (Pain 10, Urinary 3, QOL 9)    Education:       HEP issued: [facility]  Access Code: [email or web address]  Progression issued 8/30/24: Access Code: [email or web address]    Careplan Goals:  Active       UTE       1) Pt to be Indep with a home exercise program with >80% adherence by the patient (Progressing)       Start:  08/13/24    Expected End:  09/24/24            2) Pt to report 50% reduction in incontinence episodes to indicate increased bladder control during exercise       Start:  08/13/24    Expected End:  11/05/24            3) Improve MMT of lateral pelvic girdle to 5/5to improve pelvic stability with functional activities        Start:  08/13/24    Expected End:  11/05/24            4) Functional Outcome NIH CPSI score improves by >50% raw score to indicate improvement in subscales       Start:  08/13/24    Expected End:  11/05/24            5) Pt to report decreased nocturia to </=1x/night to improve her sleep quality       Start:  08/13/24    Expected End:  11/05/24                  Pippa Aviles, PT

## 2024-09-09 ENCOUNTER — TREATMENT (OUTPATIENT)
Dept: PHYSICAL THERAPY | Facility: HOSPITAL | Age: 39
End: 2024-09-09
Payer: COMMERCIAL

## 2024-09-09 DIAGNOSIS — N39.3 STRESS INCONTINENCE OF URINE: ICD-10-CM

## 2024-09-09 DIAGNOSIS — M62.81 MUSCLE WEAKNESS: ICD-10-CM

## 2024-09-09 PROCEDURE — 97110 THERAPEUTIC EXERCISES: CPT | Mod: GP | Performed by: PHYSICAL THERAPIST

## 2024-09-09 NOTE — PROGRESS NOTES
" Physical Therapy    PELVIC FLOOR TREATMENT    Name: Lanette Weldon  MRN: [id number]  : 1985  Today's Date: 2024  Visit Number: 3     PT Therapeutic Procedures Time Entry  Therapeutic Exercise Time Entry: 40                 Assessment:    Discussed trying out a 2-hour timed voiding schedule during the day to help to empty her bladder at more regular intervals in an attempt to eliminate the nocturia. Patient was agreeable. Progressed some pelvic girdle strengthening exercises today without discomfort, but difficulty maintaining pelvic stability without rotation in quadruped.     Plan:    Continue to progress core and PF strength    Current Problem:  Problem List Items Addressed This Visit             ICD-10-CM    Stress incontinence of urine N39.3    Muscle weakness M62.81     Subjective   General  Pt reports only getting up 0-1x/night lately, but it is very much dependent on how much water she drinks throughout the day.    Precautions   Precautions  STEADI Fall Risk Score (The score of 4 or more indicates an increased risk of falling): 4  Precautions Comment: none noted     Pain   Pain Assessment: 0-10  0-10 (Numeric) Pain Score: 1  Pain Type: Chronic pain  Pain Location: Back  Pain Orientation: Lower    Objective   -added quadruped strength exercises    Treatments:  EXERCISES Date 24 Date 24 Date Date    REPS REPS REPS REPS   Visit Number #2 #3            H/L TA 5\"H x5 5\"H x5     H/L TA w/marching 1x10 ea 1x5 ea     H/L TA w/T-band hip abd Grn 1x10       1/2 table top single-leg kick out  2x5 ea up high     Bridges 5\"H 1x10 10\"H 1x10            Quadruped TA  -alt UE lifts  -alt LE lifts  5\"H 1x  1x10 ea  1x15 ea     Birddogs              Clamshells 1x10 ea 2x10 ea     Reverse clamshells 1x10 ea 2x10 ea                                                                                                                            HEP         OUTCOMES MEASURE:  Female NIH-CPSI: 22 (Pain 10, Urinary 3, QOL " 9)    Education:       HEP issued: [facility].[employer]  Access Code: [email or web address]  Progression issued 8/30/24: Access Code: [email or web address]    Careplan Goals:  Active       UTE       1) Pt to be Indep with a home exercise program with >80% adherence by the patient (Progressing)       Start:  08/13/24    Expected End:  09/24/24            2) Pt to report 50% reduction in incontinence episodes to indicate increased bladder control during exercise       Start:  08/13/24    Expected End:  11/05/24            3) Improve MMT of lateral pelvic girdle to 5/5to improve pelvic stability with functional activities        Start:  08/13/24    Expected End:  11/05/24            4) Functional Outcome NIH CPSI score improves by >50% raw score to indicate improvement in subscales       Start:  08/13/24    Expected End:  11/05/24            5) Pt to report decreased nocturia to </=1x/night to improve her sleep quality       Start:  08/13/24    Expected End:  11/05/24                  Pippa Aviles, PT

## 2024-09-16 ENCOUNTER — APPOINTMENT (OUTPATIENT)
Dept: PHYSICAL THERAPY | Facility: HOSPITAL | Age: 39
End: 2024-09-16
Payer: COMMERCIAL

## 2024-09-23 ENCOUNTER — TREATMENT (OUTPATIENT)
Dept: PHYSICAL THERAPY | Facility: HOSPITAL | Age: 39
End: 2024-09-23
Payer: COMMERCIAL

## 2024-09-23 DIAGNOSIS — M62.81 MUSCLE WEAKNESS: ICD-10-CM

## 2024-09-23 DIAGNOSIS — N39.3 STRESS INCONTINENCE OF URINE: ICD-10-CM

## 2024-09-23 PROCEDURE — 97110 THERAPEUTIC EXERCISES: CPT | Mod: GP | Performed by: PHYSICAL THERAPIST

## 2024-09-23 ASSESSMENT — PAIN - FUNCTIONAL ASSESSMENT: PAIN_FUNCTIONAL_ASSESSMENT: 0-10

## 2024-09-23 ASSESSMENT — PAIN SCALES - GENERAL: PAINLEVEL_OUTOF10: 0 - NO PAIN

## 2024-09-23 NOTE — PROGRESS NOTES
" Physical Therapy    PELVIC FLOOR TREATMENT    Name: Lanette Weldon  MRN: [id number]  : 1985  Today's Date: 2024  Visit Number: 4     PT Therapeutic Procedures Time Entry  Therapeutic Exercise Time Entry: 45                 Assessment:    Pt performed new pelvic floor contraction exercises without any discomfort, but noted increased difficulty with legs apart or in a split stance position. States immediate PF relaxation occurs with a full lunge. She demonstrated and verbalized understanding of progressive HEP focusing more on PF engagement with movement.    Plan:    Reassess new HEP. Continue to progress core and PF strength    Current Problem:  Problem List Items Addressed This Visit             ICD-10-CM    Stress incontinence of urine N39.3    Muscle weakness M62.81     Subjective   General  Pt states the urinary incontinence is much less during exercise now, as she has been trying to engage her PF muscles before doing the cardio movements, but it still persists. No pain lately.    Precautions  Precautions  STEADI Fall Risk Score (The score of 4 or more indicates an increased risk of falling): 4  Precautions Comment: none noted     Pain  Pain Assessment: 0-10  0-10 (Numeric) Pain Score: 0 - No pain  Pain Location: Back    Objective   Progressed PF engagement exercises    Treatments:  EXERCISES Date 24 Date 24 Date 24 Date    REPS REPS REPS REPS   Visit Number #2 #3 #4           H/L TA 5\"H x5 5\"H x5     H/L TA w/marching 1x10 ea 1x5 ea     H/L TA w/T-band hip abd Grn 1x10       1/2 table top single-leg kick out  2x5 ea up high     Bridges 5\"H 1x10 10\"H 1x10 5\"H 1x10 w/Kegel           Quadruped TA  -alt UE lifts  -alt LE lifts  5\"H 1x  1x10 ea  1x15 ea     Birddogs              Clamshells 1x10 ea 2x10 ea     Reverse clamshells 1x10 ea 2x10 ea            Quadruped w/Kegel  -alt UE lifts   1x10 3-5\"H  1x10 ea    Standing Kegel   3\"H 2x5     Mini squat w/Kegel   1x10     Sit-stand Kegel   1x2  " "  Split stance Kegel   1x5 ea 3\"H    Mini lunge Kegel   1x5 ea    T-band X walk w/Kegel   1x5 ea w/Grn band           1/2 kneel T-band chop Kegel  - w/TA   Blue 1x10 ea  Blue 1x10 ea    Lat pull down Kegel   Blue 2x10    Low row w/Kegel   Mini squat Blue 2x10                                              HEP   Access Code: [email or web address]      OUTCOMES MEASURE:  Female NIH-CPSI: 22 (Pain 10, Urinary 3, QOL 9)    Education:       HEP [facility]  Access Code: [email or web address]  Progression issued 8/30/24: Access Code: [email or web address]  Progressed 9/23/24: Access Code: [email or web address]    Careplan Goals:  Active       UTE       1) Pt to be Indep with a home exercise program with >80% adherence by the patient (Progressing)       Start:  08/13/24    Expected End:  09/24/24            2) Pt to report 50% reduction in incontinence episodes to indicate increased bladder control during exercise (Progressing)       Start:  08/13/24    Expected End:  11/05/24            3) Improve MMT of lateral pelvic girdle to 5/5to improve pelvic stability with functional activities  (Progressing)       Start:  08/13/24    Expected End:  11/05/24            4) Functional Outcome NIH CPSI score improves by >50% raw score to indicate improvement in subscales       Start:  08/13/24    Expected End:  11/05/24            5) Pt to report decreased nocturia to </=1x/night to improve her sleep quality       Start:  08/13/24    Expected End:  11/05/24                  Pippa Aviles, PT    "

## 2024-09-30 ENCOUNTER — APPOINTMENT (OUTPATIENT)
Dept: PHYSICAL THERAPY | Facility: HOSPITAL | Age: 39
End: 2024-09-30
Payer: COMMERCIAL

## 2024-10-07 ENCOUNTER — APPOINTMENT (OUTPATIENT)
Dept: PHYSICAL THERAPY | Facility: HOSPITAL | Age: 39
End: 2024-10-07
Payer: COMMERCIAL

## 2024-10-21 ENCOUNTER — APPOINTMENT (OUTPATIENT)
Dept: PHYSICAL THERAPY | Facility: HOSPITAL | Age: 39
End: 2024-10-21
Payer: COMMERCIAL

## 2024-10-22 ENCOUNTER — APPOINTMENT (OUTPATIENT)
Dept: PRIMARY CARE | Facility: CLINIC | Age: 39
End: 2024-10-22
Payer: COMMERCIAL

## 2024-10-22 ENCOUNTER — LAB (OUTPATIENT)
Dept: LAB | Facility: LAB | Age: 39
End: 2024-10-22
Payer: COMMERCIAL

## 2024-10-22 VITALS
WEIGHT: 150.2 LBS | BODY MASS INDEX: 25.02 KG/M2 | SYSTOLIC BLOOD PRESSURE: 110 MMHG | HEIGHT: 65 IN | TEMPERATURE: 97.9 F | DIASTOLIC BLOOD PRESSURE: 60 MMHG

## 2024-10-22 DIAGNOSIS — J45.20 MILD INTERMITTENT ASTHMA IN ADULT WITHOUT COMPLICATION (HHS-HCC): ICD-10-CM

## 2024-10-22 DIAGNOSIS — Z12.31 ENCOUNTER FOR SCREENING MAMMOGRAM FOR MALIGNANT NEOPLASM OF BREAST: ICD-10-CM

## 2024-10-22 DIAGNOSIS — Z23 NEED FOR INFLUENZA VACCINATION: ICD-10-CM

## 2024-10-22 DIAGNOSIS — B00.9 HERPES SIMPLEX: ICD-10-CM

## 2024-10-22 DIAGNOSIS — Z00.00 HEALTHCARE MAINTENANCE: Primary | ICD-10-CM

## 2024-10-22 DIAGNOSIS — Z00.00 HEALTHCARE MAINTENANCE: ICD-10-CM

## 2024-10-22 LAB
ALBUMIN SERPL BCP-MCNC: 4.5 G/DL (ref 3.4–5)
ALP SERPL-CCNC: 48 U/L (ref 33–110)
ALT SERPL W P-5'-P-CCNC: 8 U/L (ref 7–45)
ANION GAP SERPL CALC-SCNC: 11 MMOL/L (ref 10–20)
AST SERPL W P-5'-P-CCNC: 13 U/L (ref 9–39)
BASOPHILS # BLD AUTO: 0.08 X10*3/UL (ref 0–0.1)
BASOPHILS NFR BLD AUTO: 0.8 %
BILIRUB SERPL-MCNC: 0.6 MG/DL (ref 0–1.2)
BUN SERPL-MCNC: 14 MG/DL (ref 6–23)
CALCIUM SERPL-MCNC: 9.3 MG/DL (ref 8.6–10.3)
CHLORIDE SERPL-SCNC: 103 MMOL/L (ref 98–107)
CHOLEST SERPL-MCNC: 157 MG/DL (ref 0–199)
CHOLESTEROL/HDL RATIO: 2.4
CO2 SERPL-SCNC: 27 MMOL/L (ref 21–32)
CREAT SERPL-MCNC: 0.84 MG/DL (ref 0.5–1.05)
EGFRCR SERPLBLD CKD-EPI 2021: 90 ML/MIN/1.73M*2
EOSINOPHIL # BLD AUTO: 0.09 X10*3/UL (ref 0–0.7)
EOSINOPHIL NFR BLD AUTO: 0.9 %
ERYTHROCYTE [DISTWIDTH] IN BLOOD BY AUTOMATED COUNT: 17.2 % (ref 11.5–14.5)
FERRITIN SERPL-MCNC: 13 NG/ML (ref 8–150)
GLUCOSE SERPL-MCNC: 78 MG/DL (ref 74–99)
HCT VFR BLD AUTO: 32.5 % (ref 36–46)
HDLC SERPL-MCNC: 65.1 MG/DL
HGB BLD-MCNC: 10 G/DL (ref 12–16)
IMM GRANULOCYTES # BLD AUTO: 0.04 X10*3/UL (ref 0–0.7)
IMM GRANULOCYTES NFR BLD AUTO: 0.4 % (ref 0–0.9)
IRON SATN MFR SERPL: 15 % (ref 25–45)
IRON SERPL-MCNC: 66 UG/DL (ref 35–150)
LDLC SERPL CALC-MCNC: 87 MG/DL
LYMPHOCYTES # BLD AUTO: 2.28 X10*3/UL (ref 1.2–4.8)
LYMPHOCYTES NFR BLD AUTO: 22.2 %
MCH RBC QN AUTO: 24.2 PG (ref 26–34)
MCHC RBC AUTO-ENTMCNC: 30.8 G/DL (ref 32–36)
MCV RBC AUTO: 79 FL (ref 80–100)
MONOCYTES # BLD AUTO: 0.34 X10*3/UL (ref 0.1–1)
MONOCYTES NFR BLD AUTO: 3.3 %
NEUTROPHILS # BLD AUTO: 7.46 X10*3/UL (ref 1.2–7.7)
NEUTROPHILS NFR BLD AUTO: 72.4 %
NON HDL CHOLESTEROL: 92 MG/DL (ref 0–149)
NRBC BLD-RTO: 0 /100 WBCS (ref 0–0)
PLATELET # BLD AUTO: 344 X10*3/UL (ref 150–450)
POTASSIUM SERPL-SCNC: 4 MMOL/L (ref 3.5–5.3)
PROT SERPL-MCNC: 7.2 G/DL (ref 6.4–8.2)
RBC # BLD AUTO: 4.14 X10*6/UL (ref 4–5.2)
SODIUM SERPL-SCNC: 137 MMOL/L (ref 136–145)
TIBC SERPL-MCNC: 442 UG/DL (ref 240–445)
TRIGL SERPL-MCNC: 25 MG/DL (ref 0–149)
TSH SERPL-ACNC: 1.27 MIU/L (ref 0.44–3.98)
UIBC SERPL-MCNC: 376 UG/DL (ref 110–370)
VLDL: 5 MG/DL (ref 0–40)
WBC # BLD AUTO: 10.3 X10*3/UL (ref 4.4–11.3)

## 2024-10-22 PROCEDURE — 84443 ASSAY THYROID STIM HORMONE: CPT

## 2024-10-22 PROCEDURE — 90471 IMMUNIZATION ADMIN: CPT | Performed by: NURSE PRACTITIONER

## 2024-10-22 PROCEDURE — 83550 IRON BINDING TEST: CPT

## 2024-10-22 PROCEDURE — 85025 COMPLETE CBC W/AUTO DIFF WBC: CPT

## 2024-10-22 PROCEDURE — 83036 HEMOGLOBIN GLYCOSYLATED A1C: CPT

## 2024-10-22 PROCEDURE — 3008F BODY MASS INDEX DOCD: CPT | Performed by: NURSE PRACTITIONER

## 2024-10-22 PROCEDURE — 83540 ASSAY OF IRON: CPT

## 2024-10-22 PROCEDURE — 80053 COMPREHEN METABOLIC PANEL: CPT

## 2024-10-22 PROCEDURE — 99396 PREV VISIT EST AGE 40-64: CPT | Performed by: NURSE PRACTITIONER

## 2024-10-22 PROCEDURE — 82728 ASSAY OF FERRITIN: CPT

## 2024-10-22 PROCEDURE — 80061 LIPID PANEL: CPT

## 2024-10-22 PROCEDURE — 36415 COLL VENOUS BLD VENIPUNCTURE: CPT

## 2024-10-22 PROCEDURE — 90656 IIV3 VACC NO PRSV 0.5 ML IM: CPT | Performed by: NURSE PRACTITIONER

## 2024-10-22 PROCEDURE — 1036F TOBACCO NON-USER: CPT | Performed by: NURSE PRACTITIONER

## 2024-10-22 RX ORDER — SERTRALINE HYDROCHLORIDE 50 MG/1
1.5 TABLET, FILM COATED ORAL
COMMUNITY
Start: 2024-10-10

## 2024-10-22 RX ORDER — ALBUTEROL SULFATE 90 UG/1
2 INHALANT RESPIRATORY (INHALATION) EVERY 4 HOURS PRN
Qty: 18 G | Refills: 0 | Status: SHIPPED | OUTPATIENT
Start: 2024-10-22

## 2024-10-22 RX ORDER — ACYCLOVIR 400 MG/1
400 TABLET ORAL 3 TIMES DAILY
Qty: 15 TABLET | Refills: 2 | Status: SHIPPED | OUTPATIENT
Start: 2024-10-22 | End: 2024-10-27

## 2024-10-22 ASSESSMENT — ENCOUNTER SYMPTOMS
PSYCHIATRIC NEGATIVE: 1
EYES NEGATIVE: 1
GASTROINTESTINAL NEGATIVE: 1
ENDOCRINE NEGATIVE: 1
CARDIOVASCULAR NEGATIVE: 1
NEUROLOGICAL NEGATIVE: 1
CONSTITUTIONAL NEGATIVE: 1
MUSCULOSKELETAL NEGATIVE: 1
RESPIRATORY NEGATIVE: 1
HEMATOLOGIC/LYMPHATIC NEGATIVE: 1

## 2024-10-22 ASSESSMENT — PATIENT HEALTH QUESTIONNAIRE - PHQ9
SUM OF ALL RESPONSES TO PHQ9 QUESTIONS 1 AND 2: 0
1. LITTLE INTEREST OR PLEASURE IN DOING THINGS: NOT AT ALL
2. FEELING DOWN, DEPRESSED OR HOPELESS: NOT AT ALL

## 2024-10-22 NOTE — PATIENT INSTRUCTIONS
I will follow up with the blood work and complete your forms.   Medications refilled.   Influenza vaccine given today.  Please get the updated COVID vaccine.   Follow up in 1 year and as needed

## 2024-10-22 NOTE — PROGRESS NOTES
"Subjective   Patient ID: Lanette Weldon is a 39 y.o. female who presents for Annual Exam (Waist: 32.5in).    HPI overall is good.   Dental every 6 months.  Brushes 2-3 times a day  Floss on occasion.  Eye exam yearly.  Wears glasses.   No hearing loss.   No regular skin checks.   Non smoker, alcohol 0-1 every 3 months.  No drug use.   Well balanced diet.   Caffeine 1-2 decaf a day.   Exercise  5-6 times a day burn boot camp 45 minutes.   Pap 2023 normal, hpv negative  Mammogram 10/2023 normal  Immunizatiosn as noted   Psychiatrist requesting ferritin and iron check  Review of Systems   Constitutional: Negative.    HENT: Negative.     Eyes: Negative.    Respiratory: Negative.     Cardiovascular: Negative.    Gastrointestinal: Negative.    Endocrine: Negative.    Genitourinary: Negative.    Musculoskeletal: Negative.    Skin: Negative.    Neurological: Negative.    Hematological: Negative.    Psychiatric/Behavioral: Negative.         Objective   /60   Temp 36.6 °C (97.9 °F)   Ht 1.657 m (5' 5.25\")   Wt 68.1 kg (150 lb 3.2 oz)   BMI 24.80 kg/m²     Physical Exam  Constitutional:       Appearance: Normal appearance.   HENT:      Right Ear: Tympanic membrane, ear canal and external ear normal.      Left Ear: Tympanic membrane, ear canal and external ear normal.      Nose: Nose normal.      Mouth/Throat:      Mouth: Mucous membranes are moist.      Pharynx: Oropharynx is clear.   Eyes:      Pupils: Pupils are equal, round, and reactive to light.   Neck:      Thyroid: No thyroid mass, thyromegaly or thyroid tenderness.   Cardiovascular:      Rate and Rhythm: Normal rate and regular rhythm.      Pulses: Normal pulses.      Heart sounds: Normal heart sounds.   Pulmonary:      Effort: Pulmonary effort is normal.      Breath sounds: Normal breath sounds.   Abdominal:      General: Abdomen is flat. Bowel sounds are normal.      Palpations: Abdomen is soft.   Musculoskeletal:         General: Normal range of motion.     "  Cervical back: Normal range of motion.   Lymphadenopathy:      Cervical: No cervical adenopathy.   Skin:     General: Skin is warm.   Neurological:      General: No focal deficit present.      Mental Status: She is alert and oriented to person, place, and time.   Psychiatric:         Mood and Affect: Mood normal.         Behavior: Behavior normal.         Assessment/Plan   Problem List Items Addressed This Visit             ICD-10-CM    Asthma in adult ([facility]) J45.909    Relevant Medications    albuterol 90 mcg/actuation inhaler    Herpes simplex B00.9    Relevant Medications    acyclovir (Zovirax) 400 mg tablet     Other Visit Diagnoses         Codes    Healthcare maintenance    -  Primary Z00.00    Relevant Orders    Comprehensive Metabolic Panel    Lipid Panel    Thyroid Stimulating Hormone    CBC and Auto Differential    Iron and TIBC    Ferritin    Need for influenza vaccination     Z23    Relevant Orders    Flu vaccine, trivalent, preservative free, age 6 months and greater (Fluarix/Fluzone/Flulaval)    Encounter for screening mammogram for malignant neoplasm of breast     Z12.31    Relevant Orders    BI mammo bilateral screening tomosynthesis

## 2024-10-23 DIAGNOSIS — Z00.00 HEALTHCARE MAINTENANCE: Primary | ICD-10-CM

## 2024-10-23 LAB
EST. AVERAGE GLUCOSE BLD GHB EST-MCNC: 117 MG/DL
HBA1C MFR BLD: 5.7 %

## 2024-11-04 ENCOUNTER — APPOINTMENT (OUTPATIENT)
Dept: PHYSICAL THERAPY | Facility: HOSPITAL | Age: 39
End: 2024-11-04
Payer: COMMERCIAL

## 2024-11-13 DIAGNOSIS — J45.20 MILD INTERMITTENT ASTHMA IN ADULT WITHOUT COMPLICATION (HHS-HCC): ICD-10-CM

## 2024-11-13 RX ORDER — ALBUTEROL SULFATE 90 UG/1
2 INHALANT RESPIRATORY (INHALATION) EVERY 4 HOURS PRN
Qty: 18 G | Refills: 0 | OUTPATIENT
Start: 2024-11-13

## 2024-12-05 ENCOUNTER — HOSPITAL ENCOUNTER (OUTPATIENT)
Dept: RADIOLOGY | Facility: CLINIC | Age: 39
Discharge: HOME | End: 2024-12-05
Payer: COMMERCIAL

## 2024-12-05 VITALS — HEIGHT: 65 IN | WEIGHT: 150 LBS | BODY MASS INDEX: 24.99 KG/M2

## 2024-12-05 DIAGNOSIS — Z12.31 ENCOUNTER FOR SCREENING MAMMOGRAM FOR MALIGNANT NEOPLASM OF BREAST: ICD-10-CM

## 2024-12-05 PROCEDURE — 77063 BREAST TOMOSYNTHESIS BI: CPT | Performed by: RADIOLOGY

## 2024-12-05 PROCEDURE — 77067 SCR MAMMO BI INCL CAD: CPT | Performed by: RADIOLOGY

## 2024-12-05 PROCEDURE — 77067 SCR MAMMO BI INCL CAD: CPT

## 2025-10-27 ENCOUNTER — APPOINTMENT (OUTPATIENT)
Dept: PRIMARY CARE | Facility: CLINIC | Age: 40
End: 2025-10-27
Payer: COMMERCIAL